# Patient Record
Sex: FEMALE | Race: WHITE | NOT HISPANIC OR LATINO | Employment: OTHER | URBAN - METROPOLITAN AREA
[De-identification: names, ages, dates, MRNs, and addresses within clinical notes are randomized per-mention and may not be internally consistent; named-entity substitution may affect disease eponyms.]

---

## 2017-05-23 ENCOUNTER — TRANSCRIBE ORDERS (OUTPATIENT)
Dept: ADMINISTRATIVE | Age: 62
End: 2017-05-23

## 2017-05-23 ENCOUNTER — APPOINTMENT (OUTPATIENT)
Dept: LAB | Age: 62
End: 2017-05-23
Payer: COMMERCIAL

## 2017-05-23 ENCOUNTER — HOSPITAL ENCOUNTER (OUTPATIENT)
Dept: RADIOLOGY | Age: 62
Discharge: HOME | End: 2017-05-23
Payer: COMMERCIAL

## 2017-05-23 DIAGNOSIS — E03.9 UNSPECIFIED HYPOTHYROIDISM: Primary | ICD-10-CM

## 2017-05-23 DIAGNOSIS — E03.9 UNSPECIFIED HYPOTHYROIDISM: ICD-10-CM

## 2017-05-23 DIAGNOSIS — Z12.31 ENCOUNTER FOR SCREENING MAMMOGRAM FOR MALIGNANT NEOPLASM OF BREAST: ICD-10-CM

## 2017-05-23 LAB — TSH SERPL DL<=0.05 MIU/L-ACNC: 4.57 UIU/ML (ref 0.36–3.74)

## 2017-05-23 PROCEDURE — 36415 COLL VENOUS BLD VENIPUNCTURE: CPT

## 2017-05-23 PROCEDURE — 84443 ASSAY THYROID STIM HORMONE: CPT

## 2017-05-23 PROCEDURE — G0202 SCR MAMMO BI INCL CAD: HCPCS

## 2018-06-05 ENCOUNTER — HOSPITAL ENCOUNTER (OUTPATIENT)
Dept: RADIOLOGY | Age: 63
Discharge: HOME | End: 2018-06-05
Payer: COMMERCIAL

## 2018-06-05 DIAGNOSIS — Z12.31 ENCOUNTER FOR SCREENING MAMMOGRAM FOR MALIGNANT NEOPLASM OF BREAST: ICD-10-CM

## 2018-06-05 PROCEDURE — 77067 SCR MAMMO BI INCL CAD: CPT

## 2018-07-16 ENCOUNTER — TRANSCRIBE ORDERS (OUTPATIENT)
Dept: ADMINISTRATIVE | Facility: HOSPITAL | Age: 63
End: 2018-07-16

## 2018-07-16 DIAGNOSIS — M79.604 PAIN IN BOTH LOWER EXTREMITIES: ICD-10-CM

## 2018-07-16 DIAGNOSIS — R60.9 SWELLING: Primary | ICD-10-CM

## 2018-07-16 DIAGNOSIS — M79.605 PAIN IN BOTH LOWER EXTREMITIES: ICD-10-CM

## 2018-07-17 ENCOUNTER — HOSPITAL ENCOUNTER (OUTPATIENT)
Dept: NON INVASIVE DIAGNOSTICS | Facility: CLINIC | Age: 63
Discharge: HOME | End: 2018-07-17
Payer: COMMERCIAL

## 2018-07-17 DIAGNOSIS — M79.605 PAIN IN BOTH LOWER EXTREMITIES: ICD-10-CM

## 2018-07-17 DIAGNOSIS — M79.604 PAIN IN BOTH LOWER EXTREMITIES: ICD-10-CM

## 2018-07-17 DIAGNOSIS — R60.9 SWELLING: ICD-10-CM

## 2018-07-17 PROCEDURE — 93971 EXTREMITY STUDY: CPT

## 2018-07-18 PROCEDURE — 93971 EXTREMITY STUDY: CPT | Performed by: SURGERY

## 2019-06-11 ENCOUNTER — TRANSCRIBE ORDERS (OUTPATIENT)
Dept: ADMINISTRATIVE | Facility: HOSPITAL | Age: 64
End: 2019-06-11

## 2019-06-11 DIAGNOSIS — Z12.39 BREAST SCREENING, UNSPECIFIED: Primary | ICD-10-CM

## 2019-06-14 ENCOUNTER — HOSPITAL ENCOUNTER (OUTPATIENT)
Dept: RADIOLOGY | Age: 64
Discharge: HOME | End: 2019-06-14
Payer: COMMERCIAL

## 2019-06-14 VITALS — BODY MASS INDEX: 26.46 KG/M2 | WEIGHT: 155 LBS | HEIGHT: 64 IN

## 2019-06-14 DIAGNOSIS — Z12.39 BREAST SCREENING, UNSPECIFIED: ICD-10-CM

## 2019-06-14 PROCEDURE — 77067 SCR MAMMO BI INCL CAD: CPT

## 2020-11-09 ENCOUNTER — TRANSCRIBE ORDERS (OUTPATIENT)
Dept: ADMINISTRATIVE | Facility: HOSPITAL | Age: 65
End: 2020-11-09

## 2023-10-16 ENCOUNTER — TELEPHONE (OUTPATIENT)
Dept: HEMATOLOGY ONCOLOGY | Facility: CLINIC | Age: 68
End: 2023-10-16

## 2023-10-16 ENCOUNTER — TELEPHONE (OUTPATIENT)
Dept: CARDIAC SURGERY | Facility: CLINIC | Age: 68
End: 2023-10-16

## 2023-10-16 NOTE — TELEPHONE ENCOUNTER
Called patient to try and get her scheduled with Dr. Willie Salgado this week. I said if she would like to book this appointment to please call [facility] [place] at [phone]. I did evens try to reach out to her  but the phone just kept ringing.     Will try this afternoon again if no response just to see

## 2023-10-17 ENCOUNTER — TELEPHONE (OUTPATIENT)
Dept: CARDIAC SURGERY | Facility: CLINIC | Age: 68
End: 2023-10-17

## 2023-10-17 ENCOUNTER — TELEPHONE (OUTPATIENT)
Dept: HEMATOLOGY ONCOLOGY | Facility: CLINIC | Age: 68
End: 2023-10-17

## 2023-10-17 NOTE — TELEPHONE ENCOUNTER
Patient Call    Who are you speaking with? Physician Office    If it is not the patient, are they listed on an active communication consent form? N/A   What is the reason for this call? Calling for [place] who fax over about getting desk images   Does this require a call back? N/A   If a call back is required, please list best call back number [phone]  Mayra mckeon    If a call back is required, advise that a message will be forwarded to their care team and someone will return their call as soon as possible. Did you relay this information to the patient?  Yes

## 2023-10-17 NOTE — TELEPHONE ENCOUNTER
Spoke with patient to get her scheduled with Dr. Shreya Hernandez. She denied next weeks appointment as family is coming from out of town. She was questioning on why she needed to be seen as she spoke with provider who ordered CT and they told her it would be a 1% chance of it turning malignant. Patient stated she has other medical problem that are more urgent going on and that her daughter in law is pushing for this appointment to be done.

## 2023-11-01 ENCOUNTER — OFFICE VISIT (OUTPATIENT)
Dept: CARDIAC SURGERY | Facility: CLINIC | Age: 68
End: 2023-11-01
Payer: COMMERCIAL

## 2023-11-01 VITALS
OXYGEN SATURATION: 100 % | DIASTOLIC BLOOD PRESSURE: 80 MMHG | BODY MASS INDEX: 26.5 KG/M2 | HEIGHT: 64 IN | HEART RATE: 69 BPM | WEIGHT: 155.2 LBS | SYSTOLIC BLOOD PRESSURE: 140 MMHG | TEMPERATURE: 97.9 F | RESPIRATION RATE: 15 BRPM

## 2023-11-01 DIAGNOSIS — R91.1 LUNG NODULE: Primary | ICD-10-CM

## 2023-11-01 PROCEDURE — 99205 OFFICE O/P NEW HI 60 MIN: CPT | Performed by: THORACIC SURGERY (CARDIOTHORACIC VASCULAR SURGERY)

## 2023-11-01 RX ORDER — EVOLOCUMAB 140 MG/ML
140 INJECTION, SOLUTION SUBCUTANEOUS
COMMUNITY
Start: 2023-08-31

## 2023-11-01 RX ORDER — LORAZEPAM 0.5 MG/1
0.5 TABLET ORAL DAILY PRN
COMMUNITY
Start: 2023-07-25

## 2023-11-01 RX ORDER — LEVOTHYROXINE SODIUM 50 MCG
TABLET ORAL
COMMUNITY
Start: 2012-03-13

## 2023-11-01 RX ORDER — FAMCICLOVIR 500 MG/1
TABLET ORAL
COMMUNITY
Start: 2011-09-19

## 2023-11-01 RX ORDER — ROSUVASTATIN CALCIUM 5 MG/1
5 TABLET, COATED ORAL DAILY
COMMUNITY
Start: 2023-08-30 | End: 2024-08-29

## 2023-11-01 RX ORDER — EZETIMIBE 10 MG/1
10 TABLET ORAL DAILY
COMMUNITY
Start: 2023-08-30 | End: 2024-08-29

## 2023-11-01 RX ORDER — ESTRADIOL 1 MG/1
1 TABLET ORAL DAILY
COMMUNITY
Start: 2012-12-18

## 2023-11-01 RX ORDER — DICYCLOMINE HYDROCHLORIDE 10 MG/1
CAPSULE ORAL
COMMUNITY
Start: 2001-12-12

## 2023-11-01 RX ORDER — ASPIRIN 81 MG/1
81 TABLET, CHEWABLE ORAL DAILY
COMMUNITY
Start: 2023-08-31 | End: 2024-08-30

## 2023-11-01 RX ORDER — VALSARTAN 160 MG/1
1 TABLET ORAL DAILY
COMMUNITY
Start: 2003-10-10

## 2023-11-01 RX ORDER — LEVOTHYROXINE SODIUM 75 MCG
TABLET ORAL
COMMUNITY
Start: 2005-04-11

## 2023-11-01 RX ORDER — EVOLOCUMAB 140 MG/ML
INJECTION, SOLUTION SUBCUTANEOUS
COMMUNITY
Start: 2023-09-16

## 2023-11-01 RX ORDER — DOXEPIN HYDROCHLORIDE 25 MG/1
CAPSULE ORAL
COMMUNITY
Start: 2012-03-13

## 2023-11-01 RX ORDER — HYDROCHLOROTHIAZIDE 12.5 MG/1
1 CAPSULE, GELATIN COATED ORAL DAILY
COMMUNITY
Start: 2012-01-03

## 2023-11-01 NOTE — ASSESSMENT & PLAN NOTE
Ms Jaky Palencia has a small left apical lung nodule measuring 3mm that was found incidentally on CT C spine. She has minimal smoking history and low risk for lung cancer. Given it's small size and her lack of risk factors this is likely not malignant. We will get a dedicated CT chest for evaluation of her entire lung fields. We will call her with these results and then evaluate need for further follow-up after review of imaging. All questions were answered and she is in agreement with this plan.

## 2023-11-01 NOTE — PROGRESS NOTES
Thoracic Consult  Assessment/Plan:    Lung nodule  Ms Ariana Kline has a small left apical lung nodule measuring 3mm that was found incidentally on CT C spine. She has minimal smoking history and low risk for lung cancer. Given it's small size and her lack of risk factors this is likely not malignant. We will get a dedicated CT chest for evaluation of her entire lung fields. We will call her with these results and then evaluate need for further follow-up after review of imaging. All questions were answered and she is in agreement with this plan. Diagnoses and all orders for this visit:    Lung nodule  -     Cancel: CT chest wo contrast; Future  -     CT chest wo contrast; Future    Other orders  -     Diovan 160 MG tablet; Take 1 tablet by mouth daily  -     rosuvastatin (CRESTOR) 5 mg tablet; Take 5 mg by mouth daily (Patient not taking: Reported on 11/1/2023)  -     LORazepam (ATIVAN) 0.5 mg tablet; Take 0.5 mg by mouth daily as needed  -     Synthroid 75 MCG tablet  -     hydrochlorothiazide (MICROZIDE) 12.5 mg capsule; Take 1 capsule by mouth daily (Patient not taking: Reported on 11/1/2023)  -     Synthroid 50 MCG tablet  -     famciclovir (FAMVIR) 500 mg tablet; Take by mouth (Patient not taking: Reported on 11/1/2023)  -     ezetimibe (ZETIA) 10 mg tablet; Take 10 mg by mouth daily (Patient not taking: Reported on 11/1/2023)  -     Evolocumab (Repatha) 140 MG/ML SOSY  -     Repatha SureClick 727 MG/ML SOAJ; Inject 140 mg under the skin every 14 (fourteen) days  -     estradiol (Estrace) 1 mg tablet; Take 1 tablet by mouth daily  -     doxepin (SINEquan) 25 mg capsule; Take by mouth  -     dicyclomine (BENTYL) 10 mg capsule; Take by mouth  -     aspirin 81 mg chewable tablet;  Chew 81 mg daily (Patient not taking: Reported on 11/1/2023)          Thoracic History   Diagnosis: Left apical pulmonary nodule   Procedures/Surgeries:    Pathology:    Adjuvant Therapy:       Subjective:    Patient ID: Viet Hubbard Perry Martin is a 77 y.o. female. HPI    ECOG 1    Ms Perry Martin is a 77year-old female with a PMH TIAs, HLD, hypothyroidism who was referred to our office for evaluation of a lung nodule found on head and cervical spine imaging that was found on work up for TIA. CT C spine on 7/9/2023 was personally reviewed by myself in PACS and reveals a 3mm L apical pleuroparenchymal pulmonary nodule. PSH: no previous CT surgery    She is on ASA 81mg. On discussion she is feeling well. She had a TIA in July and had imaging of CT head and C spine that showed a left apical 3mm nodule. She has very minimal smoking history, maybe 3pk/yrs in her teens. She has no residual deficits. She has to think about her breathing when she has a TIA, she also has tingling/numbness in left arm as well as a tight restricting feeling in her left arm, this happens for about a minute about once a week. She is following with cardiology and has been started on Repatha as she does not tolerate statins. She denies cough, SOB, hemoptysis, fevers/chills or recent illness. The following portions of the patient's history were reviewed and updated as appropriate: allergies, current medications, past family history, past medical history, past social history, past surgical history, and problem list.    History reviewed. No pertinent past medical history.    Past Surgical History:   Procedure Laterality Date    HYSTERECTOMY      age 25    OOPHORECTOMY Bilateral     age 33      Family History   Problem Relation Age of Onset    No Known Problems Mother     No Known Problems Father     No Known Problems Sister     No Known Problems Maternal Grandmother     No Known Problems Maternal Grandfather     No Known Problems Paternal Grandmother     No Known Problems Paternal Grandfather     No Known Problems Sister     No Known Problems Sister     No Known Problems Maternal Aunt     No Known Problems Maternal Aunt       Social History     Socioeconomic History Marital status: /Civil Union     Spouse name: Not on file    Number of children: Not on file    Years of education: Not on file    Highest education level: Not on file   Occupational History    Not on file   Tobacco Use    Smoking status: Not on file    Smokeless tobacco: Not on file   Substance and Sexual Activity    Alcohol use: Not on file    Drug use: Not on file    Sexual activity: Not on file   Other Topics Concern    Not on file   Social History Narrative    Not on file     Social Determinants of Health     Financial Resource Strain: Not on file   Food Insecurity: Not on file   Transportation Needs: Not on file   Physical Activity: Not on file   Stress: Not on file   Social Connections: Not on file   Intimate Partner Violence: Not on file   Housing Stability: Not on file      Review of Systems   Constitutional:  Negative for chills, diaphoresis and unexpected weight change. HENT: Negative. Eyes: Negative. Respiratory:  Negative for cough, shortness of breath and wheezing. Cardiovascular:  Negative for chest pain and leg swelling. Gastrointestinal:  Negative for abdominal pain, diarrhea and nausea. Endocrine: Negative. Genitourinary: Negative. Musculoskeletal: Negative. Skin: Negative. Allergic/Immunologic: Negative. Neurological:  Positive for numbness. Hematological:  Negative for adenopathy. Does not bruise/bleed easily. Psychiatric/Behavioral: Negative. All other systems reviewed and are negative. Objective:   Physical Exam  Vitals reviewed. Constitutional:       General: She is not in acute distress. Appearance: Normal appearance. She is not ill-appearing. HENT:      Head: Normocephalic. Nose: Nose normal.      Mouth/Throat:      Mouth: Mucous membranes are moist.   Eyes:      Pupils: Pupils are equal, round, and reactive to light. Cardiovascular:      Rate and Rhythm: Normal rate and regular rhythm.       Heart sounds: Normal heart sounds. Pulmonary:      Effort: Pulmonary effort is normal.      Breath sounds: Normal breath sounds. No wheezing, rhonchi or rales. Abdominal:      Palpations: Abdomen is soft. Musculoskeletal:         General: No swelling. Normal range of motion. Lymphadenopathy:      Cervical: No cervical adenopathy. Skin:     General: Skin is warm. Neurological:      General: No focal deficit present. Mental Status: She is alert and oriented to person, place, and time. Psychiatric:         Mood and Affect: Mood normal.     /80 (BP Location: Left arm, Patient Position: Sitting, Cuff Size: Standard)   Pulse 69   Temp 97.9 °F (36.6 °C) (Temporal)   Resp 15   Ht 5' 4" (1.626 m)   Wt 70.4 kg (155 lb 3.3 oz)   SpO2 100%   BMI 26.64 kg/m²     No Chest XR results available for this patient. No CT Chest results available for this patient. No CT Chest,Abdomen,Pelvis results available for this patient. No NM PET CT results available for this patient. No Barium Swallow results available for this patient.

## 2023-11-08 ENCOUNTER — HOSPITAL ENCOUNTER (OUTPATIENT)
Dept: CT IMAGING | Facility: HOSPITAL | Age: 68
Discharge: HOME | End: 2023-11-08
Payer: COMMERCIAL

## 2023-11-08 DIAGNOSIS — R91.1 LUNG NODULE: ICD-10-CM

## 2023-11-08 PROCEDURE — 71250 CT THORAX DX C-: CPT

## 2023-11-08 PROCEDURE — G1004 CDSM NDSC: HCPCS

## 2023-11-09 ENCOUNTER — TELEPHONE (OUTPATIENT)
Dept: HEMATOLOGY ONCOLOGY | Facility: CLINIC | Age: 68
End: 2023-11-09

## 2023-11-09 NOTE — TELEPHONE ENCOUNTER
Patient Call    Who are you speaking with? Patient    If it is not the patient, are they listed on an active communication consent form? N/A   What is the reason for this call? Pt had CT last night. The tech asked her for previous exams and she wanted to make sure Dr. Yohannes Martinez had them   Does this require a call back? Yes   If a call back is required, please list best call back number [phone]    If a call back is required, advise that a message will be forwarded to their care team and someone will return their call as soon as possible. Did you relay this information to the patient?  Yes

## 2023-11-15 ENCOUNTER — TELEPHONE (OUTPATIENT)
Dept: CARDIAC SURGERY | Facility: CLINIC | Age: 68
End: 2023-11-15

## 2023-11-15 ENCOUNTER — TELEPHONE (OUTPATIENT)
Dept: HEMATOLOGY ONCOLOGY | Facility: CLINIC | Age: 68
End: 2023-11-15

## 2023-11-15 DIAGNOSIS — R91.1 LUNG NODULE: Primary | ICD-10-CM

## 2023-11-15 NOTE — TELEPHONE ENCOUNTER
I called and spoke with Darlyn Saavedra this morning. I reviewed her imaging. She has multiple small nodules in her lungs but these are unchanged. The largest is 4 mm. I reassured her that these are most likely benign. I would like to demonstrate stability with a repeat noncontrast CT in 1 year. I will order this now and see her back in our office afterwards. Radiology did comment on an incidentally found 1.5 cm cystic liver lesion. Lobe most likely this is benign. I will review this with radiology and if indicated we will order another procedure. I told her I will reach back out to her if that is the case. Otherwise follow-up with a repeat noncontrast CT in 1 year. She voiced understanding appreciation.   She will call sooner with any other questions or concerns    Alyssa Stallings

## 2023-11-15 NOTE — TELEPHONE ENCOUNTER
Abby chung from [facility] Radiology with Ximena Crowell Findings for patient CT scan of chest 11/8/23 this was ordered by Jeffery Kitchen

## 2024-11-04 ENCOUNTER — HOSPITAL ENCOUNTER (OUTPATIENT)
Dept: CT IMAGING | Facility: HOSPITAL | Age: 69
Discharge: HOME | End: 2024-11-04
Attending: THORACIC SURGERY (CARDIOTHORACIC VASCULAR SURGERY)
Payer: COMMERCIAL

## 2024-11-04 DIAGNOSIS — R91.1 LUNG NODULE: ICD-10-CM

## 2024-11-04 PROCEDURE — G1004 CDSM NDSC: HCPCS

## 2024-11-04 PROCEDURE — 71250 CT THORAX DX C-: CPT

## 2024-11-13 ENCOUNTER — OFFICE VISIT (OUTPATIENT)
Dept: CARDIAC SURGERY | Facility: CLINIC | Age: 69
End: 2024-11-13
Payer: COMMERCIAL

## 2024-11-13 VITALS
HEART RATE: 71 BPM | DIASTOLIC BLOOD PRESSURE: 79 MMHG | SYSTOLIC BLOOD PRESSURE: 138 MMHG | WEIGHT: 153.66 LBS | HEIGHT: 64 IN | OXYGEN SATURATION: 96 % | TEMPERATURE: 98.1 F | BODY MASS INDEX: 26.23 KG/M2 | RESPIRATION RATE: 14 BRPM

## 2024-11-13 DIAGNOSIS — R91.1 LUNG NODULE: Primary | ICD-10-CM

## 2024-11-13 PROCEDURE — 99213 OFFICE O/P EST LOW 20 MIN: CPT | Performed by: THORACIC SURGERY (CARDIOTHORACIC VASCULAR SURGERY)

## 2024-11-13 RX ORDER — DULOXETIN HYDROCHLORIDE 20 MG/1
20 CAPSULE, DELAYED RELEASE ORAL DAILY
COMMUNITY
Start: 2024-11-07 | End: 2025-11-07

## 2024-11-13 RX ORDER — DICLOFENAC SODIUM 25 MG/1
TABLET, DELAYED RELEASE ORAL
COMMUNITY
Start: 2024-09-18

## 2024-11-13 RX ORDER — AZELASTINE HYDROCHLORIDE 137 UG/1
SPRAY, METERED NASAL
COMMUNITY
Start: 2024-10-24

## 2024-11-13 RX ORDER — GABAPENTIN 300 MG/1
300 CAPSULE ORAL 3 TIMES DAILY
COMMUNITY
Start: 2024-11-05 | End: 2025-11-05

## 2024-11-13 RX ORDER — METHOTREXATE 2.5 MG/1
TABLET ORAL
COMMUNITY
Start: 2024-09-06

## 2024-11-13 RX ORDER — DICLOFENAC SODIUM 75 MG/1
75 TABLET, DELAYED RELEASE ORAL
COMMUNITY
Start: 2024-10-10

## 2024-11-13 RX ORDER — FOLIC ACID 1 MG/1
1 TABLET ORAL DAILY
COMMUNITY
Start: 2024-09-06

## 2024-11-13 RX ORDER — PREGABALIN 25 MG/1
25 CAPSULE ORAL
COMMUNITY
Start: 2024-11-12 | End: 2025-11-12

## 2024-11-13 RX ORDER — ACYCLOVIR 400 MG/1
TABLET ORAL
COMMUNITY
Start: 2024-10-24

## 2024-11-13 RX ORDER — AMLODIPINE BESYLATE 5 MG
TABLET ORAL
COMMUNITY
Start: 2013-11-11

## 2024-11-14 NOTE — PROGRESS NOTES
Name: Ritu Naik      : 1955      MRN: [id number]  Encounter Provider: Isaiah Ford MD  Encounter Date: 2024   Encounter department: [facility] [place]     :  Assessment & Plan  Lung nodule  69-year-old female with a minimal distant smoking history and multiple lung nodules that have been stable    I had a good conversation today with the patient and her .  She has again multiple bilateral pulmonary nodules ranging from 2 to 3 mm.  These are stable over the past year.  She has no symptoms.  She has no major risk factors for lung cancer with a minimal distant smoking history.  Given their stability I would just recommend continued observation with a repeat noncontrast CT in 1 year.  We will order this now and see her back in our office afterwards.    I have asked her to call sooner should she develop any worsening shortness of breath, cough, hemoptysis, uncontrolled weight loss or other concerns    Isaiah Ford      Orders:    CT chest wo contrast; Future        Thoracic History   Problem   Lung Nodule      History of Present Illness   HPI  Ritu Naik is a 69 y.o. female who presents back to our office for review of surveillance imaging.  Over the last year she notes no major changes in her breathing.  No major shortness of breath.  No cough or hemoptysis.  No fevers chills or unintentional weight loss.    Review of Systems   Constitutional:  Negative for activity change, appetite change, chills, diaphoresis, fatigue, fever and unexpected weight change.   HENT: Negative.     Eyes: Negative.    Respiratory:  Negative for apnea, cough, chest tightness, shortness of breath, wheezing and stridor.    Cardiovascular:  Negative for chest pain, palpitations and leg swelling.   Gastrointestinal:  Negative for abdominal distention, abdominal pain, blood in stool, constipation, diarrhea, nausea and vomiting.   Endocrine: Negative.    Genitourinary:  "Negative.    Musculoskeletal: Negative.    Skin: Negative.    Allergic/Immunologic: Negative.    Neurological: Negative.    Hematological: Negative.    Psychiatric/Behavioral: Negative.             Objective   /79 (BP Location: Right arm, Patient Position: Sitting, Cuff Size: Standard)   Pulse 71   Temp 98.1 °F (36.7 °C) (Temporal)   Resp 14   Ht 5' 4\" (1.626 m)   Wt 69.7 kg (153 lb 10.6 oz)   SpO2 96%   BMI 26.38 kg/m²     Blood pressure 138/79, pulse 71, temperature 98.1 °F (36.7 °C), temperature source Temporal, resp. rate 14, height 5' 4\" (1.626 m), weight 69.7 kg (153 lb 10.6 oz), SpO2 96%.  ECOG      I personally reviewed her CT imaging in PACS from 11/4/2024 and compared this to her previous imaging in 2023.  She again she has multiple bilateral small nodules ranging from 2 to 3 mm.  No mediastinal adenopathy.  No other suspicious findings.  No growth over the past year.  "

## 2024-11-14 NOTE — ASSESSMENT & PLAN NOTE
69-year-old female with a minimal distant smoking history and multiple lung nodules that have been stable    I had a good conversation today with the patient and her .  She has again multiple bilateral pulmonary nodules ranging from 2 to 3 mm.  These are stable over the past year.  She has no symptoms.  She has no major risk factors for lung cancer with a minimal distant smoking history.  Given their stability I would just recommend continued observation with a repeat noncontrast CT in 1 year.  We will order this now and see her back in our office afterwards.    I have asked her to call sooner should she develop any worsening shortness of breath, cough, hemoptysis, uncontrolled weight loss or other concerns    Isaiah Ford      Orders:    CT chest wo contrast; Future

## 2025-07-17 ENCOUNTER — OFFICE VISIT (OUTPATIENT)
Dept: NEUROSURGERY | Facility: CLINIC | Age: 70
End: 2025-07-17
Payer: COMMERCIAL

## 2025-07-17 VITALS
OXYGEN SATURATION: 95 % | HEART RATE: 65 BPM | HEIGHT: 64 IN | BODY MASS INDEX: 26.12 KG/M2 | TEMPERATURE: 98 F | SYSTOLIC BLOOD PRESSURE: 120 MMHG | DIASTOLIC BLOOD PRESSURE: 70 MMHG | WEIGHT: 153 LBS

## 2025-07-17 DIAGNOSIS — M51.26 LUMBAR DISC HERNIATION: ICD-10-CM

## 2025-07-17 DIAGNOSIS — M06.9 RHEUMATOID ARTHRITIS (HCC): Primary | ICD-10-CM

## 2025-07-17 DIAGNOSIS — M41.9 SCOLIOSIS: ICD-10-CM

## 2025-07-17 DIAGNOSIS — M43.16 SPONDYLOLISTHESIS OF LUMBAR REGION: ICD-10-CM

## 2025-07-17 PROCEDURE — 99203 OFFICE O/P NEW LOW 30 MIN: CPT | Performed by: NEUROLOGICAL SURGERY

## 2025-07-17 RX ORDER — METHOTREXATE 25 MG/ML
0.6 INJECTION INTRA-ARTERIAL; INTRAMUSCULAR; INTRATHECAL; INTRAVENOUS WEEKLY
COMMUNITY
Start: 2025-03-31 | End: 2025-07-21

## 2025-07-17 RX ORDER — TRIAMCINOLONE ACETONIDE 5 MG/G
1 CREAM TOPICAL 2 TIMES DAILY
COMMUNITY
Start: 2025-07-15 | End: 2026-07-15

## 2025-07-17 NOTE — PROGRESS NOTES
Name: Ritu Naik      : 1955      MRN: [id number]  Encounter Provider: José Islas MD  Encounter Date: 2025   [facility]ounter [facility]  :  Assessment & Plan  Rheumatoid arthritis (HCC)  She is recently diagnosed with rheumatoid arthritis.  She was started on methotrexate.  She is interested on transferring to a biologic such as a TNF I.  I believe this is contributing to the large risks extent in her low back.  Orders:  •  Ambulatory Referral to Rheumatology; Future    Spondylolisthesis of lumbar region  This is dynamic however it is grade 1 and does not appear to be directly compressive.  Orders:  •  Ambulatory Referral to Rheumatology; Future    Scoliosis  This is mild and has been present for many years       Lumbar disc herniation  She does have a lumbar disc herniation at the L4-5 level on the left side with lateral recess stenosis.           History of Present Illness     Ritu Naik is a 70 y.o. female who presents low back pain    2025 - c/o: LBP radiates into b/l legs; NTW in b/l legs/feet; m/s in b/l legs and stiffness. Pain 8/10.   Imaging: XR Lumbar 2025; MRI Lumbar 2024  PT: [facility] - Made worse, No relief  KANDICE: Last KANDICE was 2025 & 2025 (Lumbar) - Made worse, No relief  SX: No recent SX  AntiCoag/Platelet: No AC  Smoker: Former (Cigarettes)  Pain began in the left lower extremity and then spread to her entire back and legs.  She has been treated with methotrexate with a pulmonary diagnosis of rheumatoid arthritis which has helped her low back pain.  He is interested in trying one of the tumor necrosis factor inhibitors.  She does not believe that her left leg is worse than her right currently.  Her legs have not given out on her and she has not fallen.      Review of Systems   Musculoskeletal:  Positive for back pain and myalgias.         07/17/2025 - c/o: LBP radiates into b/l legs; NTW in b/l legs/feet; m/s in b/l legs and stiffness. Pain 8/10.   Imaging: XR Lumbar 03/24/2025; MRI Lumbar 12/26/2024  PT: [facility] 12/17/24 - Made worse, No relief  KANDICE: Last KANDICE was 03/18/2025 & 02/04/2025 (Lumbar) - Made worse, No relief  SX: No recent SX  AntiCoag/Platelet: No AC  Smoker: Former (Cigarettes)   Neurological:  Positive for weakness and numbness.   All other systems reviewed and are negative.    I have personally reviewed the MA's review of systems and made changes as necessary.    Past Medical History   Past Medical History[1]  Past Surgical History[2]  Family History[3]  she reports that she has quit smoking. Her smoking use included cigarettes. She has never used smokeless tobacco.  Current Outpatient Medications   Medication Instructions   • acyclovir (ZOVIRAX) 400 MG tablet    • aspirin 81 mg, Daily   • Azelastine HCl 137 MCG/SPRAY SOLN    • diclofenac (VOLTAREN) 75 mg   • diclofenac sodium (VOLTAREN) 25 MG EC tablet take 1 tablet by mouth twice a day if needed for pain   • dicyclomine (BENTYL) 10 mg, 2 times daily   • Diovan 160 MG tablet 1 tablet, Daily   • doxepin (SINEquan) 25 mg capsule Take by mouth   • DULoxetine (CYMBALTA) 20 mg, Daily   • estradiol (Estrace) 1 mg tablet 1 tablet, Daily   • Evolocumab (Repatha) 140 MG/ML SOSY    • ezetimibe (ZETIA) 10 mg, Daily   • famciclovir (FAMVIR) 500 mg tablet Take by mouth   • folic acid (FOLVITE) 1 mg, Daily   • gabapentin (NEURONTIN) 300 mg, 3 times daily   • hydrochlorothiazide (MICROZIDE) 12.5 mg capsule 1 capsule, Daily   • LORazepam (ATIVAN) 0.5 mg, Daily PRN   • methotrexate 2.5 MG tablet take 2 tablets by mouth IN THE MORNING and 2 tablets by mouth IN ...  (REFER TO PRESCRIPTION NOTES).   • Methotrexate Sodium (methotrexate, PF,) 4 mg/mL 0.6 mL, Weekly   • Norvasc 5 MG tablet    • pregabalin (LYRICA) 25 mg   • Repatha SureClick 140 mg, Subcutaneous,  "Every 14 days   • rosuvastatin (CRESTOR) 5 mg, Daily   • Synthroid 50 MCG tablet    • Synthroid 75 MCG tablet    • triamcinolone (KENALOG) 0.5 % cream 1 Application, 2 times daily   Allergies[4]   Objective   /70 (BP Location: Right arm, Patient Position: Sitting, Cuff Size: Adult)   Pulse 65   Temp 98 °F (36.7 °C) (Temporal)   Ht 5' 4\" (1.626 m)   Wt 69.4 kg (153 lb)   SpO2 95%   BMI 26.26 kg/m²     Physical Exam  Vitals and nursing note reviewed.   Constitutional:       General: She is not in acute distress.     Appearance: Normal appearance. She is not ill-appearing, toxic-appearing or diaphoretic.   HENT:      Head: Normocephalic.      Nose: Nose normal.     Eyes:      Extraocular Movements: Extraocular movements intact.      Pupils: Pupils are equal, round, and reactive to light.       Musculoskeletal:         General: No swelling, tenderness, deformity or signs of injury. Normal range of motion.      Cervical back: Normal range of motion and neck supple. No rigidity.      Right lower leg: No edema.      Left lower leg: No edema.   Lymphadenopathy:      Cervical: No cervical adenopathy.     Skin:     General: Skin is warm and dry.      Coloration: Skin is not jaundiced.      Findings: No erythema or rash.     Neurological:      General: No focal deficit present.      Mental Status: She is alert and oriented to person, place, and time.      Cranial Nerves: No cranial nerve deficit.      Sensory: No sensory deficit.      Motor: No weakness.      Coordination: Coordination normal.      Gait: Gait normal.      Deep Tendon Reflexes: Reflexes normal.     Psychiatric:         Mood and Affect: Mood normal.         Behavior: Behavior normal.         Thought Content: Thought content normal.         Judgment: Judgment normal.   Neurological Exam  Mental Status  Alert. Oriented to person, place, and time.    Cranial Nerves  CN III, IV, VI: Extraocular movements intact bilaterally. Pupils equal round and " reactive to light bilaterally.    Gait   Normal gait.    Radiology Results Review: I personally reviewed the following image studies in PACS and associated radiology reports: MRI spine. My interpretation of the radiology images/reports is: MRI of the lumbosacral spine is carefully reviewed.  This demonstrates a spondylolisthesis at L4-5 which is grade 1.  There is lateral recess stenosis which is greater on the left side.  There is an annular tear at the same level.  Flexion-extension x-rays were also ordered reviewed which demonstrates a 3 mm movement in flexion and extension at the same level..                 [1]  No past medical history on file.  [2]  Past Surgical History:  Procedure Laterality Date   • HYSTERECTOMY      age 31   • OOPHORECTOMY Bilateral     age 31   [3]  Family History  Problem Relation Name Age of Onset   • No Known Problems Mother     • No Known Problems Father     • No Known Problems Sister ortiz    • No Known Problems Maternal Grandmother     • No Known Problems Maternal Grandfather     • No Known Problems Paternal Grandmother     • No Known Problems Paternal Grandfather     • No Known Problems Sister elke    • No Known Problems Sister lauren    • No Known Problems Maternal Aunt carlo    • No Known Problems Maternal Aunt harmony    [4]  Allergies  Allergen Reactions   • Other Sneezing